# Patient Record
Sex: FEMALE | Race: WHITE | NOT HISPANIC OR LATINO | URBAN - METROPOLITAN AREA
[De-identification: names, ages, dates, MRNs, and addresses within clinical notes are randomized per-mention and may not be internally consistent; named-entity substitution may affect disease eponyms.]

---

## 2018-11-28 ENCOUNTER — TELEPHONE (OUTPATIENT)
Dept: SPEECH THERAPY | Facility: HOSPITAL | Age: 73
End: 2018-11-28

## 2018-11-28 DIAGNOSIS — R49.0 HOARSENESS: Primary | ICD-10-CM

## 2018-11-30 ENCOUNTER — TELEPHONE (OUTPATIENT)
Dept: SPEECH THERAPY | Facility: HOSPITAL | Age: 73
End: 2018-11-30

## 2018-11-30 ENCOUNTER — CLINICAL SUPPORT (OUTPATIENT)
Dept: SPEECH THERAPY | Facility: HOSPITAL | Age: 73
End: 2018-11-30
Payer: MEDICARE

## 2018-11-30 ENCOUNTER — OFFICE VISIT (OUTPATIENT)
Dept: OTOLARYNGOLOGY | Facility: CLINIC | Age: 73
End: 2018-11-30
Payer: MEDICARE

## 2018-11-30 VITALS
WEIGHT: 146.38 LBS | SYSTOLIC BLOOD PRESSURE: 134 MMHG | TEMPERATURE: 98 F | DIASTOLIC BLOOD PRESSURE: 61 MMHG | HEART RATE: 99 BPM

## 2018-11-30 DIAGNOSIS — R49.0 DYSPHONIA: Primary | ICD-10-CM

## 2018-11-30 DIAGNOSIS — J38.3 VOCAL FOLD ATROPHY: ICD-10-CM

## 2018-11-30 DIAGNOSIS — R49.0 HOARSENESS: Primary | ICD-10-CM

## 2018-11-30 DIAGNOSIS — R49.0 HOARSENESS: ICD-10-CM

## 2018-11-30 DIAGNOSIS — J38.02 BILATERAL PARTIAL VOCAL CORD PARALYSIS: ICD-10-CM

## 2018-11-30 DIAGNOSIS — J38.5 LARYNGEAL SPASM: ICD-10-CM

## 2018-11-30 PROCEDURE — 92524 BEHAVRAL QUALIT ANALYS VOICE: CPT | Mod: GN | Performed by: SPEECH-LANGUAGE PATHOLOGIST

## 2018-11-30 PROCEDURE — 31579 LARYNGOSCOPY TELESCOPIC: CPT | Mod: S$GLB,,, | Performed by: OTOLARYNGOLOGY

## 2018-11-30 PROCEDURE — 1101F PT FALLS ASSESS-DOCD LE1/YR: CPT | Mod: CPTII,S$GLB,, | Performed by: OTOLARYNGOLOGY

## 2018-11-30 PROCEDURE — 99999 PR PBB SHADOW E&M-EST. PATIENT-LVL III: CPT | Mod: PBBFAC,,, | Performed by: OTOLARYNGOLOGY

## 2018-11-30 PROCEDURE — G9172 VOICE GOAL STATUS: HCPCS | Mod: CH | Performed by: SPEECH-LANGUAGE PATHOLOGIST

## 2018-11-30 PROCEDURE — G9171 VOICE CURRENT STATUS: HCPCS | Mod: CJ | Performed by: SPEECH-LANGUAGE PATHOLOGIST

## 2018-11-30 PROCEDURE — 99203 OFFICE O/P NEW LOW 30 MIN: CPT | Mod: 25,S$GLB,, | Performed by: OTOLARYNGOLOGY

## 2018-11-30 RX ORDER — BUPROPION HYDROCHLORIDE 300 MG/1
300 TABLET ORAL DAILY
COMMUNITY

## 2018-11-30 RX ORDER — LEVOTHYROXINE SODIUM 100 UG/1
100 TABLET ORAL DAILY
COMMUNITY

## 2018-11-30 RX ORDER — PRAVASTATIN SODIUM 40 MG/1
40 TABLET ORAL DAILY
COMMUNITY

## 2018-11-30 RX ORDER — TOPIRAMATE 100 MG/1
100 TABLET, FILM COATED ORAL DAILY
COMMUNITY

## 2018-11-30 RX ORDER — ATORVASTATIN CALCIUM 40 MG/1
40 TABLET, FILM COATED ORAL DAILY
COMMUNITY
Start: 2018-09-11

## 2018-11-30 RX ORDER — CARVEDILOL 25 MG/1
25 TABLET ORAL DAILY
COMMUNITY
Start: 2018-11-06

## 2018-11-30 RX ORDER — LISINOPRIL AND HYDROCHLOROTHIAZIDE 20; 25 MG/1; MG/1
1 TABLET ORAL DAILY
COMMUNITY

## 2018-11-30 NOTE — PROGRESS NOTES
Referring provider: Dr. Siva Reddy  Reason for visit:  Behavioral and qualitative analysis of voice and resonance (CPT 75879)    Subjective / History    Arminda Guardado is a 73 y.o. female referred for voice evaluation (CPT 38959) by Dr. Reddy.  She presents with complaints of hoarseness, voice change, and difficulty being heard which began several (4?) years ago.  The patient also endorses: fatigue with use and reduced volume.  She is retired and lives at home with her son.  She presents with her daughter today.  She has seen a few ENTs/GI MDs regarding her voice and swallowing over the last few years.  She recalls seeing a voice therapist following a surgery for a hiatal hernia and felt it was beneficial at that time.  She used to derive benefit from esophageal dilations (both for dysphagia and dysphonia) but at this time finds she still tends to be at least somewhat symptomatic.    Breathing: coughing duringmeals    Smokin packs/day     Stroboscopy findings (per Dr. 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  Current Outpatient Medications on File Prior to Visit   Medication Sig Dispense Refill    atorvastatin (LIPITOR) 40 MG tablet Take 40 mg by mouth once daily.      buPROPion (WELLBUTRIN XL) 300 MG 24 hr tablet Take 300 mg by mouth once daily.      carvedilol (COREG) 25 MG tablet Take 25 mg by mouth once daily.      FLUZONE HIGH-DOSE , PF, 180 mcg/0.5 mL vaccine Take 180 mcg by mouth.      levothyroxine (SYNTHROID) 100 MCG tablet Take 100 mcg by mouth once daily.      lisinopril-hydrochlorothiazide (PRINZIDE,ZESTORETIC) 20-25 mg Tab Take 1 tablet by mouth once daily.      pravastatin (PRAVACHOL) 40 MG tablet Take 40 mg by mouth once daily.      topiramate (TOPAMAX) 100 MG tablet Take 100 mg by mouth Daily.   "     No current facility-administered medications on file prior to visit.        Objective    Perceptual/behavioral assessment  -CAPE-V Overall Score: 58  -Quality: weak, rough and strained  -Volume: decreased projection  -Pitch: appropriate for age and gender identity  -Flexibility: instability  -Habitual respiratory pattern: chest/clavicular    Acoustic assessment  Multi-Dimensional Voice Program (MDVP) Analysis (sustained "ah")   Baseline Gender-matched norms (F)   Average fundamental frequency (Fo) 173.286 Hz Norm/SD: 243.97 / 27.46   Relative average perturbation 4.719% Norm/SD: 0.378 / 0.21   Shimmer percent 13.446% Norm/SD: 1.997 / 0.79   Noise to harmonic ratio 0.679 Norm/SD: 0.112 / 0.01   Voice turbulence index 0.195 Norm/SD: 0.046 / 0.012     Education / Stimulability Trials   Discussed importance of vocal hygiene including: hydration. Patient was stimulable for improved voice using PhoRTE exercises.    Phonation resistance training exercises (PhoRTE [Gerardo & Melissa, 2013]): to be practiced 2x/day, 7 days/week  - Sustain the vowel /a/ with a loud, energized voice for as long as possible.  - Glide from modal pitch to high pitch on /a/ using a loud, energized voice.  - Glide from modal pitch to low pitch on the vowel /a/ using a loud, energized voice.  - Project selected functional phrases using a loud and higher-pitched voice, like calling over a fence.  Pt was able to identify prompt improvement in her voice and was amenable to practicing these exercises at home.      Functional goals  Length Status Goal   Long term Initiated Patient and clinician will facilitate changes in vocal function in order to restore functional use of voice for daily occupational, social, and emotional demands.    Long term Initiated Patient will re-establish phonation with adequate balance of airflow and resonance with decreased muscle tension.    Long term Initiated Patient will improve coordination of respiration and " phonation for efficient vocal production at a conversational level.    Short term Initiated Patient will coordinate vocal subsystems in hierarchical speech tasks by producing sound in an efficient manner yielding improved or normal voice quality and vocal endurance in the presence of existing laryngeal disorder with 90% accuracy independently.   Short term Initiated Patient will complete PhoRTE 2x daily to strengthen and balance the intrinsic laryngeal musculature and maximize glottic closure without medial hyperfunction.   Short term Initiated Patient will improve the quality, efficiency, and ease of phonation through resonant and/or airflow exercises from the syllable to conversation level with 80% accuracy.   Short term Initiated Patient will discriminate between easy and strained phonation with 80% accuracy.    Short term Initiated Patient will demonstrate the ability to increase awareness of voicing behavior through self-monitoring to facilitate generalization in functional speaking situations with 80% accuracy.        Assessment     Arminda Guardado presents with moderate dysphonia.  The encounter diagnosis was Hoarseness.  Prognosis for continued improvement is good.     Recommendations / POC    Recommend 2-4 sessions of voice/speech therapy over 3-6 weeks with a speech-language pathologist to optimize glottal postures for improved vocal function, vocal efficiency, and ease of phonation.  She should continue the exercises as discussed in session and should contact me with any further questions.

## 2018-11-30 NOTE — PROGRESS NOTES
[facility]  Department of Otorhinolaryngology and Communication Sciences    Arminda Guardado is a 73 y.o. female who presents to the [facility] for consultation at the kind request of Dr. Siva Reddy for further management of dysphonia.     She complains of hoarseness. Reoprts a rough sound to her voice and has a hard time being understood by others. Onset was gradual. Duration is several years. Time course is constant. Symptoms are worsening. Exacerbating factors include voice use. She denies any alleviating factors. Associated symptoms include dysphagia - she has been getting her esophagus stretched but denies continued symptomatic improvement at this point.     She had an esophageal surgery for a hiatal hernia (4 yrs ago) after which she underwent voice therapy in [place].  She felt that the voice therapy was helpful.      Past Medical History  HTN, myelofibrosis, carotid stenosis, migraines, pacemaker    Past Surgical History  Hiatal hernia - 2014  Back x3 - prior to 2005  Neck - several fusions - most recently 2013  Shoulder - 2012    Family History  None    Social History  She reports that  has never smoked. she has never used smokeless tobacco.    Allergies  She has No Known Allergies.    Medications  She has a current medication list which includes the following prescription(s): atorvastatin, bupropion, carvedilol, fluzone high-dose 2018-19 (pf), levothyroxine, lisinopril-hydrochlorothiazide, pravastatin, and topiramate.    Review of Systems   Constitutional: Negative for fever.   HENT: Negative for sore throat.    Eyes: Negative for visual disturbance.   Respiratory: Negative for wheezing.    Cardiovascular: Negative for chest pain.   Gastrointestinal: Negative for nausea.   Musculoskeletal: Negative for arthralgias.   Skin: Negative for rash.   Neurological: Negative for tremors.   Hematological: Does not bruise/bleed easily.   Psychiatric/Behavioral: The patient is not  nervous/anxious.           Objective:     /61   Pulse 99   Temp 97.8 °F (36.6 °C)   Wt 66.4 kg (146 lb 6.2 oz)      Physical Exam    Constitutional: comfortable, well dressed  Psychiatric: appropriate affect  Respiratory: comfortably breathing, symmetric chest rise, no stridor  Voice: mild moderate variable asthenia/breathiness/roughness/instability  Cardiovascular: upper extremities non-edematous  Lymphatic: no cervical lymphadenopathy  Neurologic: alert and oriented to time, place, person, and situation; cranial nerves 3-12 grossly intact  Head: normocephalic  Eyes: conjunctivae and sclerae clear  Ears: normal pinnae, normal external auditory canals, tympanic membranes intact  Nose: mucosa pink and noncongested, no masses, no mucopurulence, no polyps  Oral cavity / oropharynx: no mucosal lesions  Neck: soft, full range of motion, laryngotracheal complex palpable with appropriate landmarks, larynx elevates on swallowing  Indirect laryngoscopy: limited due to gag    Procedure  Rigid Laryngeal Videostroboscopy (95688): Laryngeal videostroboscopy is indicated to assess the laryngeal vibratory biomechanics and vocal fold oscillation, which cannot be assessed with a plain light examination. This was carried out with a 70 degree endoscope. After verbal consent was obtained, the patient was positioned and the tongue was gently secured with a gauze sponge. The endoscope was passed transorally and positioned to image the larynx and hypopharynx in detail. The following features were examined: laryngeal and hypopharyngeal masses; vocal fold range and symmetry of motion; laryngeal mucosal edema, erythema, inflammation, and hydration; salivary pooling; and gross laryngeal sensation. During phonation, the vocal folds were assessed for glottal closure; mucosal wave; vocal fold lesions; vibratory periodicity, amplitude, and phase symmetry; and vertical height match. The equipment was removed. The patient tolerated the  procedure well without complication. 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       Plan:        I had a discussion with the patient and her daughter regarding her condition and the further workup and management options.      SLP voice evaluation and subsequent therapy sessions are medically necessary for restoration of laryngeal function. We will arrange for this to occur here at the [facility] in the coming weeks. Depending on her progress, she may derived benefit from vocal fold injection augmentation.    She will follow up with me in 3 month(s), or sooner if needed.    All questions were answered, and the patient is in agreement with the above.     Naun Barrow M.D.  [facility]  Department of Otorhinolaryngology and Communication Sciences

## 2018-11-30 NOTE — LETTER
November 30, 2018      Siva Reddy MD  [address] [facility] [place] [address]           [facility]  [address] [facility] [address]  [facility] [address]  Phone: [phone]  Fax: [phone]          Patient: Arminda Guardado   MR Number: [id number]   YOB: 1945   Date of Visit: 11/30/2018       Dear Dr. Siva Reddy:    Thank you for referring Arminda Guardado to me for evaluation. Attached you will find relevant portions of my assessment and plan of care.    If you have questions, please do not hesitate to call me. I look forward to following Arminda Guardado along with you.    Sincerely,    Naun Barrow MD    Enclosure  CC:  No Recipients    If you would like to receive this communication electronically, please contact [email or web address] or [phone] to request more information on [facility] Link access.    For providers and/or their staff who would like to refer a patient to [facility], please contact us through our one-stop-shop provider referral line, [facility], at [phone].    If you feel you have received this communication in error or would no longer like to receive these types of communications, please e-mail [email or web address]

## 2019-01-04 ENCOUNTER — CLINICAL SUPPORT (OUTPATIENT)
Dept: SPEECH THERAPY | Facility: HOSPITAL | Age: 74
End: 2019-01-04
Payer: MEDICARE

## 2019-01-04 DIAGNOSIS — J38.5 LARYNGEAL SPASM: Primary | ICD-10-CM

## 2019-01-04 DIAGNOSIS — J38.3 VOCAL FOLD ATROPHY: ICD-10-CM

## 2019-01-04 DIAGNOSIS — R49.0 HOARSENESS: ICD-10-CM

## 2019-01-04 PROCEDURE — 92507 TX SP LANG VOICE COMM INDIV: CPT | Mod: GN | Performed by: SPEECH-LANGUAGE PATHOLOGIST

## 2019-01-04 NOTE — PROGRESS NOTES
Referring provider: Dr. Siva Reddy  Reason for visit:  Voice treatment (CPT 26029)  Session #1    History / Subjective   I had the pleasure of seeing Arminda Guardado for her first treatment session following complete voice evaluation on 11/30/18.  During that time, improvements were noted on PhoRTE/exuberant voice exercises.  Since evaluation, she reports she has been doing the voice exercises twice a day.  She is not sure if her voice has improved.  Today she reports she has a cold.       Objective   The primary goal of todays session was to review HEP.  This was targeted using PhoRTE treatment modalities.    Perceptual/behavioral assessment  -CAPE-V Overall Score: 42 (24 at end of session)  -Quality: rough, strained  -Volume: decreased projection  -Pitch: high F0  -Flexibility: diminished  -Habitual respiratory pattern: chest/clavicular    Treatment  Reviewed modified phonation resistance training exercises (PhoRTE [Gerardo & Melissa, 2013]): to be practiced 2x/day, 7 days/week.  Recorded pt with improved practice on her phone for review at home.   - Sustain the vowel /a/ with a loud, energized voice for as long as possible.  - Sustain the vowel /a/ with a loud, energized voice at a slightly high pitch.   - Sustain the vowel /a/ with a loud, energized voice at a slightly low pitch.   - Project selected functional phrases using a loud and higher-pitched voice, like calling over a fence.  Pt was able to identify prompt improvement in her voice and was amenable to practicing these exercises at home.      Functional goals  Length Status Goal   Long term Ongoing Patient and clinician will facilitate changes in vocal function in order to restore functional use of voice for daily occupational, social, and emotional demands.    Long term Ongoing Patient will re-establish phonation with adequate balance of airflow and resonance with decreased muscle tension.    Long term Ongoing Patient will improve coordination of  respiration and phonation for efficient vocal production at a conversational level.    Short term Ongoing Patient will coordinate vocal subsystems in hierarchical speech tasks by producing sound in an efficient manner yielding improved or normal voice quality and vocal endurance in the presence of existing laryngeal disorder with 90% accuracy independently.   Short term Ongoing Patient will complete PhoRTE 2x daily to strengthen and balance the intrinsic laryngeal musculature and maximize glottic closure without medial hyperfunction.   Short term Ongoing Patient will improve the quality, efficiency, and ease of phonation through resonant and/or airflow exercises from the syllable to conversation level with 80% accuracy.   Short term Ongoing Patient will discriminate between easy and strained phonation with 80% accuracy.    Short term Ongoing Patient will demonstrate the ability to increase awareness of voicing behavior through self-monitoring to facilitate generalization in functional speaking situations with 80% accuracy.        Assessment     Arminda Guardado presents with moderate dysphonia.  The primary encounter diagnosis was Laryngeal spasm. Diagnoses of Hoarseness and Vocal fold atrophy were also pertinent to this visit.  Prognosis for continued improvement is good.     Recommendations / POC    Recommend 2-4 sessions of voice/speech therapy over 3-6 weeks with a speech-language pathologist to optimize glottal postures for improved vocal function, vocal efficiency, and ease of phonation.  She should continue the exercises as discussed in session and should contact me with any further questions.

## 2019-02-15 ENCOUNTER — CLINICAL SUPPORT (OUTPATIENT)
Dept: SPEECH THERAPY | Facility: HOSPITAL | Age: 74
End: 2019-02-15
Payer: MEDICARE

## 2019-02-15 DIAGNOSIS — J38.5 LARYNGEAL SPASM: Primary | ICD-10-CM

## 2019-02-15 DIAGNOSIS — R49.0 HOARSENESS: ICD-10-CM

## 2019-02-15 DIAGNOSIS — J38.3 VOCAL FOLD ATROPHY: ICD-10-CM

## 2019-02-15 PROCEDURE — 92507 TX SP LANG VOICE COMM INDIV: CPT | Mod: GN | Performed by: SPEECH-LANGUAGE PATHOLOGIST

## 2019-02-15 NOTE — PROGRESS NOTES
"Referring provider: Dr. Siav Reddy  Reason for visit:  Voice treatment (CPT 33449)  Session #2    History / Subjective   I had the pleasure of seeing Arminda Guardado for her first treatment session following complete voice evaluation on 11/30/18.  During that time, improvements were noted on PhoRTE/exuberant voice exercises.  Since last session on 1/4/19, she reports her voice had been doing well until she took a bad fall and hasn't been able to do the exercises since then.   Today feels her voice is "kind of rough."      Objective   The primary goal of todays session was to review HEP.  This was targeted using PhoRTE treatment modalities.    Perceptual/behavioral assessment  -CAPE-V Overall Score: 44  -Quality: breathy, strained  -Volume: decreased projection  -Pitch: high F0  -Flexibility: diminished  -Habitual respiratory pattern: chest/clavicular    Treatment  Reviewed modified phonation resistance training exercises (PhoRTE [Gerardo & Melissa, 2013]): to be practiced 2x/day, 7 days/week.  Recorded pt with improved practice on her phone for review at home.  Modified to cup bubble and /u/ sound as she was less stimulable for improvement with /a/ today.    - Sustain the vowel /u/ with a loud, energized voice for as long as possible on a cup bubble.  - Sustain the vowel /u/ with a loud, energized voice at a slightly high pitch on a cup bubble.  - Sustain the vowel /u/ with a loud, energized voice at a slightly low pitch on a cup bubble.    - Project selected functional phrases using a loud and higher-pitched voice, like calling over a fence.  Intermittent improvement noted with connected speech (reading and normally connected speech). Encouraged continued practice at home.     Functional goals  Length Status Goal   Long term Ongoing Patient and clinician will facilitate changes in vocal function in order to restore functional use of voice for daily occupational, social, and emotional demands.    Long term " Ongoing Patient will re-establish phonation with adequate balance of airflow and resonance with decreased muscle tension.    Long term Ongoing Patient will improve coordination of respiration and phonation for efficient vocal production at a conversational level.    Short term Ongoing Patient will coordinate vocal subsystems in hierarchical speech tasks by producing sound in an efficient manner yielding improved or normal voice quality and vocal endurance in the presence of existing laryngeal disorder with 90% accuracy independently.   Short term Ongoing Patient will complete PhoRTE 2x daily to strengthen and balance the intrinsic laryngeal musculature and maximize glottic closure without medial hyperfunction.   Short term Ongoing Patient will improve the quality, efficiency, and ease of phonation through resonant and/or airflow exercises from the syllable to conversation level with 80% accuracy.   Short term Ongoing Patient will discriminate between easy and strained phonation with 80% accuracy.    Short term Ongoing Patient will demonstrate the ability to increase awareness of voicing behavior through self-monitoring to facilitate generalization in functional speaking situations with 80% accuracy.        Assessment     Arminda Guardado presents with moderate dysphonia.  The primary encounter diagnosis was Laryngeal spasm. Diagnoses of Hoarseness and Vocal fold atrophy were also pertinent to this visit.  Prognosis for continued improvement is fair.     Recommendations / POC    Recommend 2-4 sessions of voice/speech therapy over 3-6 weeks with a speech-language pathologist to optimize glottal postures for improved vocal function, vocal efficiency, and ease of phonation.  She should continue the exercises as discussed in session and should contact me with any further questions.

## 2019-03-15 ENCOUNTER — OFFICE VISIT (OUTPATIENT)
Dept: OTOLARYNGOLOGY | Facility: CLINIC | Age: 74
End: 2019-03-15
Payer: MEDICARE

## 2019-03-15 VITALS
TEMPERATURE: 98 F | SYSTOLIC BLOOD PRESSURE: 118 MMHG | WEIGHT: 146.63 LBS | HEART RATE: 89 BPM | DIASTOLIC BLOOD PRESSURE: 58 MMHG

## 2019-03-15 DIAGNOSIS — R49.0 DYSPHONIA: Primary | ICD-10-CM

## 2019-03-15 DIAGNOSIS — J38.5 LARYNGEAL SPASM: ICD-10-CM

## 2019-03-15 DIAGNOSIS — J38.02 BILATERAL COMPLETE VOCAL FOLD PARALYSIS: ICD-10-CM

## 2019-03-15 DIAGNOSIS — J38.3 VOCAL FOLD ATROPHY: ICD-10-CM

## 2019-03-15 PROCEDURE — 99213 OFFICE O/P EST LOW 20 MIN: CPT | Mod: S$GLB,,, | Performed by: OTOLARYNGOLOGY

## 2019-03-15 PROCEDURE — 99213 PR OFFICE/OUTPT VISIT, EST, LEVL III, 20-29 MIN: ICD-10-PCS | Mod: S$GLB,,, | Performed by: OTOLARYNGOLOGY

## 2019-03-15 PROCEDURE — 1101F PT FALLS ASSESS-DOCD LE1/YR: CPT | Mod: CPTII,S$GLB,, | Performed by: OTOLARYNGOLOGY

## 2019-03-15 PROCEDURE — 1101F PR PT FALLS ASSESS DOC 0-1 FALLS W/OUT INJ PAST YR: ICD-10-PCS | Mod: CPTII,S$GLB,, | Performed by: OTOLARYNGOLOGY

## 2019-03-15 PROCEDURE — 99999 PR PBB SHADOW E&M-EST. PATIENT-LVL III: CPT | Mod: PBBFAC,,, | Performed by: OTOLARYNGOLOGY

## 2019-03-15 PROCEDURE — 99999 PR PBB SHADOW E&M-EST. PATIENT-LVL III: ICD-10-PCS | Mod: PBBFAC,,, | Performed by: OTOLARYNGOLOGY

## 2019-03-15 NOTE — PROGRESS NOTES
[facility]  Department of Otorhinolaryngology and Communication Sciences    Subjective:      Arminda Guardado is a 73 y.o. female who presents for follow-up. 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    Had 3 sessions of voice therapy. Doing exercises every day. Has been helpful. Good days and bad days. Voice is a little scratchier over the least week or so.    Voice Handicap Index - 10 (VHI-10) score is 10.  Reflux symptom Index (RSI) score is 20.  Eating Assessment Tool - 10 (EAT-10) score is 10.    The patient's medications, allergies, past medical, surgical, social and family histories were reviewed and updated as appropriate.    A detailed review of systems was obtained with pertinent positives as per the above HPI, and otherwise negative.      Objective:     BP (!) 118/58   Pulse 89   Temp 97.9 °F (36.6 °C)   Wt 66.5 kg (146 lb 9.7 oz)      Constitutional: comfortable, well dressed  Psychiatric: appropriate affect  Respiratory: comfortably breathing, symmetric chest rise, no stridor  Voice:  mild moderate variable asthenia/breathiness/roughness/instability; mild interval improvements in strength, clarity, and stability  Head: normocephalic  Eyes: conjunctivae and sclerae clear  Indirect laryngoscopy is limited due to gag      Assessment:     Arminda Guardado is a 73 y.o. 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 She has made some mild progress with voice therapy.     Plan:     Reassurance was provided. Further treatment to consider would be vocal fold injection augmentation. I discussed with her the risks/benefits thereof. She defers on treatment at this time. I recommended adherence to her voice therapy treatment plan. She will follow up with me in about 4 months, or sooner if needed.    All questions were answered, and the patient is in agreement with the plan.    Naun Barrow M.D.  [facility]  Department of Otorhinolaryngology and Communication  Sciences